# Patient Record
Sex: FEMALE | URBAN - METROPOLITAN AREA
[De-identification: names, ages, dates, MRNs, and addresses within clinical notes are randomized per-mention and may not be internally consistent; named-entity substitution may affect disease eponyms.]

---

## 2022-03-31 ENCOUNTER — OFFICE VISIT (OUTPATIENT)
Dept: URBAN - METROPOLITAN AREA CLINIC 46 | Facility: CLINIC | Age: 32
End: 2022-03-31
Payer: COMMERCIAL

## 2022-03-31 ENCOUNTER — DASHBOARD ENCOUNTERS (OUTPATIENT)
Age: 32
End: 2022-03-31

## 2022-03-31 VITALS
SYSTOLIC BLOOD PRESSURE: 120 MMHG | HEIGHT: 68 IN | WEIGHT: 110.4 LBS | TEMPERATURE: 98.4 F | HEART RATE: 73 BPM | DIASTOLIC BLOOD PRESSURE: 81 MMHG | BODY MASS INDEX: 16.73 KG/M2

## 2022-03-31 DIAGNOSIS — R68.89: ICD-10-CM

## 2022-03-31 DIAGNOSIS — R19.7 DIARRHEA, UNSPECIFIED TYPE: ICD-10-CM

## 2022-03-31 DIAGNOSIS — R11.14 BILIOUS VOMITING WITH NAUSEA: ICD-10-CM

## 2022-03-31 PROBLEM — 61294007: Status: ACTIVE | Noted: 2022-03-31

## 2022-03-31 PROCEDURE — 99204 OFFICE O/P NEW MOD 45 MIN: CPT | Performed by: INTERNAL MEDICINE

## 2022-03-31 RX ORDER — ONDANSETRON 4 MG/1
1 TABLET ON THE TONGUE AND ALLOW TO DISSOLVE TABLET, ORALLY DISINTEGRATING ORAL
Qty: 40 | Refills: 3 | OUTPATIENT
Start: 2022-03-31

## 2022-03-31 RX ORDER — PANTOPRAZOLE SODIUM 40 MG/1
1 TABLET TABLET, DELAYED RELEASE ORAL ONCE A DAY
Qty: 90 | Refills: 3 | OUTPATIENT
Start: 2022-03-31

## 2022-03-31 RX ORDER — DICYCLOMINE HYDROCHLORIDE 10 MG/1
1 TABLET CAPSULE ORAL
Qty: 60 | Refills: 3 | OUTPATIENT
Start: 2022-03-31 | End: 2022-07-29

## 2022-03-31 NOTE — HPI-TODAY'S VISIT:
Pt with chronic low BMI but no developmental delay presents with 2 years of a change in bowels with AM clustered BM that are mushy and 4 times before noon then rest of the day no BM. Also 6 months of attacks of AM nausea that wakes from sleep and can  last several hours and or result in emesis and then diarrhea without pain. Attacks twice a month. No rash. No blood in the stool. No sudden weight loss but can not gain weught. Has a 2 year old and 1 year old. No family hx of IBD or celiac. CBC, CMP, TSH, Celiac panel all wnl. No ETOH. No THC.

## 2022-03-31 NOTE — PHYSICAL EXAM CONSTITUTIONAL:
well developed, poorly nourished , in no acute distress , ambulating without difficulty , normal communication ability

## 2022-04-26 PROBLEM — 71419002: Status: ACTIVE | Noted: 2022-03-31

## 2022-05-05 ENCOUNTER — OFFICE VISIT (OUTPATIENT)
Dept: URBAN - METROPOLITAN AREA SURGERY CENTER 27 | Facility: SURGERY CENTER | Age: 32
End: 2022-05-05